# Patient Record
Sex: MALE | Race: WHITE | NOT HISPANIC OR LATINO | URBAN - METROPOLITAN AREA
[De-identification: names, ages, dates, MRNs, and addresses within clinical notes are randomized per-mention and may not be internally consistent; named-entity substitution may affect disease eponyms.]

---

## 2022-07-25 ENCOUNTER — EMERGENCY (EMERGENCY)
Facility: HOSPITAL | Age: 27
LOS: 0 days | Discharge: HOME | End: 2022-07-26
Attending: EMERGENCY MEDICINE | Admitting: EMERGENCY MEDICINE

## 2022-07-25 VITALS
SYSTOLIC BLOOD PRESSURE: 146 MMHG | DIASTOLIC BLOOD PRESSURE: 71 MMHG | WEIGHT: 240.08 LBS | RESPIRATION RATE: 16 BRPM | HEART RATE: 96 BPM | OXYGEN SATURATION: 99 % | TEMPERATURE: 99 F

## 2022-07-25 DIAGNOSIS — R11.0 NAUSEA: ICD-10-CM

## 2022-07-25 DIAGNOSIS — H93.19 TINNITUS, UNSPECIFIED EAR: ICD-10-CM

## 2022-07-25 DIAGNOSIS — R55 SYNCOPE AND COLLAPSE: ICD-10-CM

## 2022-07-25 DIAGNOSIS — R42 DIZZINESS AND GIDDINESS: ICD-10-CM

## 2022-07-25 PROCEDURE — 93010 ELECTROCARDIOGRAM REPORT: CPT

## 2022-07-25 PROCEDURE — 99284 EMERGENCY DEPT VISIT MOD MDM: CPT

## 2022-07-25 NOTE — ED ADULT TRIAGE NOTE - AS TEMP SITE
Date of last visit:  3/9/2021  Date of next visit:  6/10/2021    Requested Prescriptions     Pending Prescriptions Disp Refills    loratadine (CLARITIN) 10 MG tablet [Pharmacy Med Name: LORATADINE 10 MG TABLET] 90 tablet 1     Sig: take 1 tablet by mouth once daily oral

## 2022-07-25 NOTE — ED ADULT TRIAGE NOTE - CHIEF COMPLAINT QUOTE
pt c/o lightheadedness and ringing in ears while out to dinner, pt then stood outside and it went away after 5 mins. pt stated he felt abdominal pain before which is still present now

## 2022-07-26 NOTE — ED PROVIDER NOTE - CARE PROVIDER_API CALL
PCP,   a Primary Care Physician  Phone: (   )    -  Fax: (   )    -  Follow Up Time: 7-10 Days    Aleisha Villanueva)  Cardiovascular Disease; Internal Medicine  [address] [place] [address]  Phone: [phone]  Fax: [phone]  Follow Up Time: 7-10 Days

## 2022-07-26 NOTE — ED PROVIDER NOTE - OBJECTIVE STATEMENT
26M no pmh p/w near-syncope. Ate dinner at restaurant 7:45pm, after walking outside began to feel lightheaded with diaphoresis, tinnitis & nausea, felt like he was going to pass out. Came to ED. Currently asx. Denies ha, vision changes, cp/sob, abd pain despite triage note, diarrhea, edema, focal numbness or weakness. Non-smoker. No recent travel/surgery/immobilization/hormone use. No FHx early HD/SCD.

## 2022-07-26 NOTE — ED PROVIDER NOTE - PATIENT PORTAL LINK FT
You can access the FollowMyHealth Patient Portal offered by [facility] by registering at the following website: [email or web address]. By joining [employer]’s FollowMyHealth portal, you will also be able to view your health information using other applications (apps) compatible with our system.

## 2022-07-26 NOTE — ED PROVIDER NOTE - NSFOLLOWUPINSTRUCTIONS_ED_ALL_ED_FT
Near-Syncope    Near-syncope is when you suddenly become weak or dizzy, or you feel like you might pass out (faint). During an episode of near-syncope, you may:    Feel dizzy or light-headed.  Feel nauseous.  See all white or all black in your field of vision.  Have cold, clammy skin.     This condition is caused by a sudden decrease in blood flow to the brain. This decrease can result from various causes, but most of those causes are not dangerous. However, near-syncope can be a sign of a serious medical problem, so it is important to seek medical care.     If you fainted, get medical help right away. Call your local emergency services (911 in the [place] Do not drive yourself to the hospital.     HOME CARE INSTRUCTIONS  Pay attention to any changes in your symptoms. Take these actions to help with your condition:    Have someone stay with you until you feel stable.    Do not drive, use machinery, or play sports until your health care provider says it is okay.    Keep all follow-up visits as told by your health care provider. This is important.    If you start to feel like you might faint, lie down right away and raise (elevate) your feet above the level of your heart. Breathe deeply and steadily. Wait until all of the symptoms have passed.   Drink enough fluid to keep your urine clear or pale yellow.    If you are taking blood pressure or heart medicine, get up slowly and take several minutes to sit and then stand. This can reduce dizziness.    Take over-the-counter and prescription medicines only as told by your health care provider.      SEEK IMMEDIATE MEDICAL CARE IF:  You have a severe headache.    You have unusual pain in your chest, abdomen, or back.    You are bleeding from your mouth or rectum, or you have black or tarry stool.    You have a very fast or irregular heartbeat (palpitations).    You faint once or repeatedly.   You have a seizure.   You are confused.    You have trouble walking.    You have severe weakness.    You have vision problems.      These symptoms may represent a serious problem that is an emergency. Do not wait to see if your symptoms will go away. Get medical help right away. Call your local emergency services (911 in the [place] Do not drive yourself to the hospital.    ADDITIONAL NOTES AND INSTRUCTIONS    Please follow up with your Primary MD in 24-48 hr.  Seek immediate medical care for any new/worsening signs or symptoms.

## 2022-07-26 NOTE — ED PROVIDER NOTE - CLINICAL SUMMARY MEDICAL DECISION MAKING FREE TEXT BOX
near-syncope - avss, CV/neuro exam nm, ekg/bg nml - all results d/w pt & copies given, strict return precautions discussed, rec outpt pcp/cardio f/u

## 2022-07-26 NOTE — ED PROVIDER NOTE - NS ED ROS FT
Constitutional: No fever, chills, unintended weight loss.  Eyes:  No visual changes, eye pain or discharge.  ENMT:  No hearing changes, pain, no sore throat or runny nose, no difficulty swallowing  Cardiac:  No chest pain, SOB or edema. No chest pain with exertion.  Respiratory:  No cough or respiratory distress. No hemoptysis. No history of asthma or RAD.  GI:  +nausea, no vomiting, diarrhea or abdominal pain.  :  No dysuria, frequency or burning.  MS:  No myalgia, muscle weakness, joint pain or back pain.  Neuro:  No headache or focal weakness.  No LOC.  Skin:  No skin rash.   Endocrine: No history of thyroid disease or diabetes.

## 2022-07-26 NOTE — ED PROVIDER NOTE - PROVIDER TOKENS
FREE:[LAST:[PCP],PHONE:[(   )    -],FAX:[(   )    -],ADDRESS:[a Primary Care Physician],FOLLOWUP:[7-10 Days]],PROVIDER:[TOKEN:[[id number]],FOLLOWUP:[7-10 Days]]

## 2022-07-26 NOTE — ED PROVIDER NOTE - CARE PROVIDERS DIRECT ADDRESSES
,DirectAddress_Unknown,[email or web address]

## 2022-07-26 NOTE — ED PROVIDER NOTE - PHYSICAL EXAMINATION
PE:  nad  skin warm, dry  ncat  neck supple  rrr nl s1s2 no mrg  ctab no wrr  abd soft ntnd no palpable masses no rgr  back non-tender no cvat  ext no cce dpi  neuro aaox3, cn 2-12 intact bl no nystagmus or facial droop, 5/5 strength x 4 no drift, gross sensation intact, finger-nose nl, gait nl

## 2024-06-06 NOTE — ED ADULT NURSE NOTE - TEMPLATE
How Severe Is Your Skin Lesion?: mild Is This A New Presentation, Or A Follow-Up?: Skin Lesions General

## 2025-07-05 ENCOUNTER — EMERGENCY (EMERGENCY)
Facility: HOSPITAL | Age: 30
LOS: 0 days | Discharge: HOME | End: 2025-07-05
Attending: EMERGENCY MEDICINE
Payer: COMMERCIAL

## 2025-07-05 VITALS
OXYGEN SATURATION: 96 % | WEIGHT: 263.45 LBS | TEMPERATURE: 98 F | HEART RATE: 93 BPM | DIASTOLIC BLOOD PRESSURE: 91 MMHG | SYSTOLIC BLOOD PRESSURE: 151 MMHG | RESPIRATION RATE: 18 BRPM

## 2025-07-05 DIAGNOSIS — H57.11 OCULAR PAIN, RIGHT EYE: ICD-10-CM

## 2025-07-05 DIAGNOSIS — H57.8A1 FOREIGN BODY SENSATION, RIGHT EYE: ICD-10-CM

## 2025-07-05 PROCEDURE — 99284 EMERGENCY DEPT VISIT MOD MDM: CPT

## 2025-07-05 PROCEDURE — 99283 EMERGENCY DEPT VISIT LOW MDM: CPT

## 2025-07-05 RX ORDER — OFLOXACIN 3 MG/ML
1 SOLUTION OPHTHALMIC ONCE
Refills: 0 | Status: COMPLETED | OUTPATIENT
Start: 2025-07-05 | End: 2025-07-05

## 2025-07-05 RX ADMIN — OFLOXACIN 1 DROP(S): 3 SOLUTION OPHTHALMIC at 14:59
